# Patient Record
Sex: MALE | Race: AMERICAN INDIAN OR ALASKA NATIVE | URBAN - METROPOLITAN AREA
[De-identification: names, ages, dates, MRNs, and addresses within clinical notes are randomized per-mention and may not be internally consistent; named-entity substitution may affect disease eponyms.]

---
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
## 2024-01-05 ENCOUNTER — APPOINTMENT (RX ONLY)
Dept: URBAN - METROPOLITAN AREA CLINIC 142 | Facility: CLINIC | Age: 19
Setting detail: DERMATOLOGY
End: 2024-01-05

## 2024-01-05 DIAGNOSIS — L29.89 OTHER PRURITUS: ICD-10-CM

## 2024-01-05 DIAGNOSIS — B86 SCABIES: ICD-10-CM

## 2024-01-05 PROBLEM — L29.8 OTHER PRURITUS: Status: ACTIVE | Noted: 2024-01-05

## 2024-01-05 PROCEDURE — ? COUNSELING

## 2024-01-05 PROCEDURE — 99213 OFFICE O/P EST LOW 20 MIN: CPT

## 2024-01-05 PROCEDURE — ? PRESCRIPTION MEDICATION MANAGEMENT

## 2024-01-05 PROCEDURE — ? PRESCRIPTION

## 2024-01-05 RX ORDER — PERMETHRIN 50 MG/G
CREAM TOPICAL QD
Qty: 60 | Refills: 1 | Status: ERX | COMMUNITY
Start: 2024-01-05

## 2024-01-05 RX ORDER — IVERMECTIN 3 MG/1
TABLET ORAL
Qty: 8 | Refills: 0 | Status: ERX | COMMUNITY
Start: 2024-01-05

## 2024-01-05 RX ADMIN — PERMETHRIN: 50 CREAM TOPICAL at 00:00

## 2024-01-05 RX ADMIN — IVERMECTIN: 3 TABLET ORAL at 00:00

## 2024-01-05 ASSESSMENT — LOCATION ZONE DERM
LOCATION ZONE: TRUNK
LOCATION ZONE: SCROTUM

## 2024-01-05 ASSESSMENT — LOCATION DETAILED DESCRIPTION DERM
LOCATION DETAILED: LEFT INFERIOR MEDIAL SCROTUM
LOCATION DETAILED: LEFT INFERIOR LATERAL LOWER BACK

## 2024-01-05 ASSESSMENT — LOCATION SIMPLE DESCRIPTION DERM
LOCATION SIMPLE: LEFT LOWER BACK
LOCATION SIMPLE: LEFT SCROTUM

## 2024-01-05 NOTE — PROCEDURE: MIPS QUALITY
Quality 226: Preventive Care And Screening: Tobacco Use: Screening And Cessation Intervention: Patient screened for tobacco use and is an ex/non-smoker
Quality 402: Tobacco Use And Help With Quitting Among Adolescents: Patient screened for tobacco and never smoked
Detail Level: Zone
Quality 431: Preventive Care And Screening: Unhealthy Alcohol Use - Screening: Patient not identified as an unhealthy alcohol user when screened for unhealthy alcohol use using a systematic screening method

## 2024-01-05 NOTE — PROCEDURE: PRESCRIPTION MEDICATION MANAGEMENT
Initiate Treatment: permethrin 5 % topical cream QD\\nQuantity: 60.0 g  Days Supply: 7\\nSig: Apply neck down to feet overnight x 24 hours. Wash off in AM. Repeat in 1 week.\\n\\nivermectin 3 mg tablet \\nQuantity: 8.0 Tablet  Days Supply: 14\\nSig: Take 4 pills today repeat in 1 week
Render In Strict Bullet Format?: No
Detail Level: Zone

## 2024-01-19 ENCOUNTER — APPOINTMENT (RX ONLY)
Dept: URBAN - METROPOLITAN AREA CLINIC 142 | Facility: CLINIC | Age: 19
Setting detail: DERMATOLOGY
End: 2024-01-19

## 2024-01-19 ENCOUNTER — RX ONLY (OUTPATIENT)
Age: 19
Setting detail: RX ONLY
End: 2024-01-19

## 2024-01-19 DIAGNOSIS — B86 SCABIES: ICD-10-CM | Status: RESOLVING

## 2024-01-19 PROCEDURE — ? PRESCRIPTION MEDICATION MANAGEMENT

## 2024-01-19 PROCEDURE — ? COUNSELING

## 2024-01-19 PROCEDURE — 99213 OFFICE O/P EST LOW 20 MIN: CPT

## 2024-01-19 RX ORDER — PERMETHRIN 50 MG/G
CREAM TOPICAL QD
Qty: 60 | Refills: 1 | Status: ERX

## 2024-01-19 ASSESSMENT — LOCATION DETAILED DESCRIPTION DERM: LOCATION DETAILED: LEFT INFERIOR MEDIAL SCROTUM

## 2024-01-19 ASSESSMENT — LOCATION SIMPLE DESCRIPTION DERM: LOCATION SIMPLE: LEFT SCROTUM

## 2024-01-19 ASSESSMENT — LOCATION ZONE DERM: LOCATION ZONE: SCROTUM

## 2024-01-19 NOTE — HPI: FOLLOW UP OTHER
Pt verbalized understanding of discharge instructions. Encouraged questions, no questions at this time. Pt safely ambulated to [facility]. Cathryn ROBLES.     What Is Your Reason For Requesting A Follow-Up Appointment?: Scabies

## 2024-01-19 NOTE — PROCEDURE: PRESCRIPTION MEDICATION MANAGEMENT
Render In Strict Bullet Format?: No
Detail Level: Zone
Plan: permethrin 5 % topical cream QD\\nivermectin 3 mg tablet